# Patient Record
Sex: FEMALE | Race: WHITE | NOT HISPANIC OR LATINO | Employment: FULL TIME | URBAN - METROPOLITAN AREA
[De-identification: names, ages, dates, MRNs, and addresses within clinical notes are randomized per-mention and may not be internally consistent; named-entity substitution may affect disease eponyms.]

---

## 2023-03-22 ENCOUNTER — OFFICE VISIT (OUTPATIENT)
Dept: URGENT CARE | Facility: CLINIC | Age: 48
End: 2023-03-22

## 2023-03-22 VITALS
WEIGHT: 170 LBS | BODY MASS INDEX: 31.28 KG/M2 | TEMPERATURE: 97.5 F | RESPIRATION RATE: 15 BRPM | HEIGHT: 62 IN | SYSTOLIC BLOOD PRESSURE: 112 MMHG | DIASTOLIC BLOOD PRESSURE: 72 MMHG | OXYGEN SATURATION: 98 % | HEART RATE: 103 BPM

## 2023-03-22 DIAGNOSIS — B37.9 ANTIBIOTIC-INDUCED YEAST INFECTION: ICD-10-CM

## 2023-03-22 DIAGNOSIS — R09.81 SINUS CONGESTION: ICD-10-CM

## 2023-03-22 DIAGNOSIS — T36.95XA ANTIBIOTIC-INDUCED YEAST INFECTION: ICD-10-CM

## 2023-03-22 DIAGNOSIS — J06.9 PROTRACTED URI: ICD-10-CM

## 2023-03-22 PROCEDURE — 99203 OFFICE O/P NEW LOW 30 MIN: CPT | Performed by: PHYSICIAN ASSISTANT

## 2023-03-22 RX ORDER — METHYLPREDNISOLONE 4 MG/1
TABLET ORAL
Qty: 1 EACH | Refills: 0 | Status: SHIPPED | OUTPATIENT
Start: 2023-03-22

## 2023-03-22 RX ORDER — LISDEXAMFETAMINE DIMESYLATE 40 MG/1
CAPSULE ORAL
COMMUNITY
Start: 2023-02-24

## 2023-03-22 RX ORDER — OMEPRAZOLE 40 MG/1
40 CAPSULE, DELAYED RELEASE ORAL DAILY
COMMUNITY
Start: 2023-03-02

## 2023-03-22 RX ORDER — FLUOXETINE HYDROCHLORIDE 20 MG/1
20 CAPSULE ORAL DAILY
COMMUNITY

## 2023-03-22 RX ORDER — METOPROLOL SUCCINATE 25 MG/1
25 TABLET, EXTENDED RELEASE ORAL DAILY
COMMUNITY
Start: 2023-03-09

## 2023-03-22 RX ORDER — MONTELUKAST SODIUM 10 MG/1
10 TABLET ORAL DAILY
COMMUNITY
Start: 2023-02-02

## 2023-03-22 RX ORDER — FLUCONAZOLE 150 MG/1
TABLET ORAL
Qty: 2 TABLET | Refills: 0 | Status: SHIPPED | OUTPATIENT
Start: 2023-03-22

## 2023-03-22 RX ORDER — IBUPROFEN 200 MG
600 TABLET ORAL
COMMUNITY

## 2023-03-22 RX ORDER — PREDNISONE 20 MG/1
TABLET ORAL
COMMUNITY
Start: 2023-03-10

## 2023-03-22 RX ORDER — CETIRIZINE HYDROCHLORIDE 10 MG/1
10 TABLET ORAL DAILY
COMMUNITY

## 2023-03-22 RX ORDER — AZELASTINE HYDROCHLORIDE 137 UG/1
SPRAY, METERED NASAL
COMMUNITY
Start: 2023-01-19

## 2023-03-22 RX ORDER — BUDESONIDE AND FORMOTEROL FUMARATE DIHYDRATE 160; 4.5 UG/1; UG/1
AEROSOL RESPIRATORY (INHALATION)
COMMUNITY
Start: 2023-03-20

## 2023-03-22 ASSESSMENT — ENCOUNTER SYMPTOMS
SPUTUM PRODUCTION: 0
ABDOMINAL PAIN: 0
COUGH: 1
SINUS PAIN: 1
FEVER: 0
DIARRHEA: 0
WHEEZING: 0
VOMITING: 0
CHILLS: 0
NAUSEA: 0
SHORTNESS OF BREATH: 0

## 2023-03-22 NOTE — PROGRESS NOTES
Subjective:     Buffy Gutierrez  is a 47 y.o. female who presents for Otalgia and Sinusitis (Has been sick for a few weeks. Starting to get a rash)      Sinusitis  This is a new problem. The current episode started 1 to 4 weeks ago. Associated symptoms include congestion, coughing and ear pain. Pertinent negatives include no chills or shortness of breath.     Patient resents urgent care noting weeks of sinus congestion symptoms.  She is traveled here from [facility].  She was initially on an antibiotic from her primary care which sounds like Augmentin.  She states she began to have a reaction contacted her providers and was placed on prednisone.  She reports significant improvement in sinus congestion and drainage while taking prednisone.  She again returned to the urgent care with persistent sinus symptoms and was put on cefdinir.  Patient reports over the last few days, now 5 days into cefdinir having swelling to ears and a rash on arms.  She also has been taking decongestant and Flonase nasal spray.  She reports slow improvement of sinus congestion.  She planes of itchiness to ear.  Some mild persistent coughing.  She notes past medical history of COVID.  She has had pneumonia in the past.  Patient returns to [place] in the next 2 days and will follow-up with PCP.  She is concerned for yeast vaginitis secondary to the above antibiotics succession over the last approximate 3 weeks    Review of Systems   Constitutional:  Negative for chills and fever.   HENT:  Positive for congestion, ear pain and sinus pain.    Respiratory:  Positive for cough. Negative for sputum production, shortness of breath and wheezing.    Gastrointestinal:  Negative for abdominal pain, diarrhea, nausea and vomiting.   Genitourinary:         Symptoms of yeast vaginitis   Skin:  Negative for rash.     Medications:    Azelastine HCl Soln  budesonide-formoterol Aero  cetirizine Tabs  FLUoxetine Caps  ibuprofen Tabs  metoprolol SR  "Tb24  montelukast Tabs  omeprazole  predniSONE Tabs  Vyvanse Caps    Allergies: Azithromycin    Problem List: Buffy Gutierrez does not have a problem list on file.    Surgical History:  No past surgical history on file.    Past Social Hx: Buffy Gutierrez  reports that she has never smoked. She has never used smokeless tobacco. She reports that she does not currently use alcohol. She reports that she does not currently use drugs.     Past Family Hx:  Buffy Gutierrez family history is not on file.     Problem list, medications, and allergies reviewed by myself today in Epic.     Objective:   /72 (BP Location: Right arm, Patient Position: Sitting, BP Cuff Size: Adult long)   Pulse (!) 103   Temp 36.4 °C (97.5 °F) (Temporal)   Resp 15   Ht 1.575 m (5' 2\")   Wt 77.1 kg (170 lb)   SpO2 98%   BMI 31.09 kg/m²     Physical Exam  Vitals and nursing note reviewed.   Constitutional:       General: She is not in acute distress.     Appearance: She is well-developed. She is not diaphoretic.   HENT:      Head: Normocephalic and atraumatic.      Comments: Diffuse erythema edema and scaling to bilateral ears     Right Ear: Tympanic membrane, ear canal and external ear normal. Tympanic membrane is not erythematous.      Left Ear: Tympanic membrane, ear canal and external ear normal. Tympanic membrane is not erythematous.      Nose: Nose normal.      Mouth/Throat:      Lips: Pink.      Mouth: Mucous membranes are moist.      Pharynx: Uvula midline. Posterior oropharyngeal erythema ( mild PND) present. No oropharyngeal exudate or uvula swelling.      Tonsils: No tonsillar abscesses.   Eyes:      General: Lids are normal. No scleral icterus.        Right eye: No discharge.         Left eye: No discharge.      Conjunctiva/sclera: Conjunctivae normal.   Pulmonary:      Effort: Pulmonary effort is normal. No respiratory distress.      Breath sounds: Normal breath sounds. No stridor. No decreased breath sounds, wheezing, rhonchi or " rales.   Musculoskeletal:         General: Normal range of motion.      Cervical back: Neck supple.   Skin:     General: Skin is warm and dry.      Coloration: Skin is not pale.      Findings: No erythema.      Comments: Erythematous slightly raised papular rash seen on bilateral forearms   Neurological:      Mental Status: She is alert and oriented to person, place, and time. She is not disoriented.   Psychiatric:         Speech: Speech normal.         Behavior: Behavior normal.       Assessment/Plan:   Assessment      1. Protracted URI  - methylPREDNISolone (MEDROL DOSEPAK) 4 MG Tablet Therapy Pack; Take as directed on package.  Dispense one package.  Dispense: 1 Each; Refill: 0    2. Sinus congestion  - methylPREDNISolone (MEDROL DOSEPAK) 4 MG Tablet Therapy Pack; Take as directed on package.  Dispense one package.  Dispense: 1 Each; Refill: 0    3. Antibiotic-induced yeast infection  - fluconazole (DIFLUCAN) 150 MG tablet; Take one tablet orally for yeast infection, if symptoms persist, may repeat treatment after 72 hours.  Dispense: 2 Tablet; Refill: 0    Other orders  - predniSONE (DELTASONE) 20 MG Tab; TAKE 3 TABLETS BY MOUTH DAILY FOR 3 DAYS, THEN TAKE 2 TABLETS DAILY FOR 3 DAYS, THEN TAKE 1 TABLET DAILY FOR 3 DAYS, THEN TAKE 1/2 (ONE-HALF) TABLET DAILY FOR 4 DAYS  - omeprazole (PRILOSEC) 40 MG delayed-release capsule; Take 40 mg by mouth every day.  - montelukast (SINGULAIR) 10 MG Tab; Take 10 mg by mouth every day.  - metoprolol SR (TOPROL XL) 25 MG TABLET SR 24 HR; Take 25 mg by mouth every day.  - VYVANSE 40 MG Cap; TAKE 1 CAPSULE BY MOUTH ONCE DAILY IN THE MORNING FOR 30 DAYS  - ibuprofen (MOTRIN) 200 MG Tab; Take 600 mg by mouth.  - FLUoxetine (PROZAC) 20 MG Cap; Take 20 mg by mouth every day.  - cetirizine (ZYRTEC) 10 MG Tab; Take 10 mg by mouth every day.  - Azelastine HCl 137 MCG/SPRAY Solution; USE 2 SPRAY(S) IN EACH NOSTRIL TWICE DAILY FOR 30 DAYS  - budesonide-formoterol (SYMBICORT) 160-4.5  MCG/ACT Aerosol    Supportive care is reviewed with patient/caregiver - recommend to push PO fluids and electrolytes, recommendation to stop antibiotics as patient appears to be having a reaction, treated with Medrol now for anti-inflammatory effects and aid with the remaining sinus as well as cough symptoms, continue with antihistamine as well as nasal spray  Follow-up with PCP upon returning home  Return to clinic with lack of resolution or progression of symptoms.      I have worn an N95 mask, gloves and eye protection for the entire encounter with this patient.     Differential diagnosis, natural history, supportive care, and indications for immediate follow-up discussed.